# Patient Record
Sex: MALE
[De-identification: names, ages, dates, MRNs, and addresses within clinical notes are randomized per-mention and may not be internally consistent; named-entity substitution may affect disease eponyms.]

---

## 2020-07-26 ENCOUNTER — NURSE TRIAGE (OUTPATIENT)
Dept: OTHER | Facility: CLINIC | Age: 35
End: 2020-07-26

## 2020-07-26 NOTE — TELEPHONE ENCOUNTER
Reason for Disposition   [1] Drinking very little AND [2] dehydration suspected (e.g., no urine > 12 hours, very dry mouth, very lightheaded)    Answer Assessment - Initial Assessment Questions  1. VOMITING SEVERITY: \"How many times have you vomited in the past 24 hours? \"      - MILD:  1 - 2 times/day     - MODERATE: 3 - 5 times/day, decreased oral intake without significant weight loss or symptoms of dehydration     - SEVERE: 6 or more times/day, vomits everything or nearly everything, with significant weight loss, symptoms of dehydration       12 times since onset. 2. ONSET: \"When did the vomiting begin? \"       1800 this evening   3. FLUIDS: \"What fluids or food have you vomited up today? \" \"Have you been able to keep any fluids down? \"      Cant keep anything down   4. ABDOMINAL PAIN: Aleen Opitz your having any abdominal pain? \" If yes : \"How bad is it and what does it feel like? \" (e.g., crampy, dull, intermittent, constant)       Current pain level   5. DIARRHEA: \"Is there any diarrhea? \" If so, ask: \"How many times today? \"       Denies diarrhea. 6. CONTACTS: \"Is there anyone else in the family with the same symptoms? \"       Unsure if in contact with someone  7. CAUSE: \"What do you think is causing your vomiting? \"      Unsure of cause   8. HYDRATION STATUS: \"Any signs of dehydration? \" (e.g., dry mouth [not only dry lips], too weak to stand) \"When did you last urinate? \"      Mouth is dry  9. OTHER SYMPTOMS: \"Do you have any other symptoms? \" (e.g., fever, headache, vertigo, vomiting blood or coffee grounds, recent head injury)      Nausea, no blood in vomit, feels like a fever but temp is 96.6, dizziness    This triage is a result of a call to M[facility] Nurse. Please do not respond to the triage nurse through this encounter. Any subsequent communication should be directly with the patient.     Protocols used: [facility] AND [facility]